# Patient Record
Sex: FEMALE | Race: WHITE | NOT HISPANIC OR LATINO
[De-identification: names, ages, dates, MRNs, and addresses within clinical notes are randomized per-mention and may not be internally consistent; named-entity substitution may affect disease eponyms.]

---

## 2020-07-08 PROBLEM — Z00.00 ENCOUNTER FOR PREVENTIVE HEALTH EXAMINATION: Status: ACTIVE | Noted: 2020-07-08

## 2020-08-12 ENCOUNTER — APPOINTMENT (OUTPATIENT)
Dept: DERMATOLOGY | Facility: CLINIC | Age: 40
End: 2020-08-12

## 2021-10-04 ENCOUNTER — APPOINTMENT (OUTPATIENT)
Dept: DERMATOLOGY | Facility: CLINIC | Age: 41
End: 2021-10-04

## 2022-01-19 ENCOUNTER — APPOINTMENT (OUTPATIENT)
Dept: DERMATOLOGY | Facility: CLINIC | Age: 42
End: 2022-01-19
Payer: COMMERCIAL

## 2022-01-19 ENCOUNTER — NON-APPOINTMENT (OUTPATIENT)
Age: 42
End: 2022-01-19

## 2022-01-19 DIAGNOSIS — L71.9 ROSACEA, UNSPECIFIED: ICD-10-CM

## 2022-01-19 PROCEDURE — 99203 OFFICE O/P NEW LOW 30 MIN: CPT

## 2022-01-19 RX ORDER — AZELAIC ACID 0.15 G/G
15 AEROSOL, FOAM TOPICAL
Qty: 1 | Refills: 2 | Status: ACTIVE | COMMUNITY
Start: 2022-01-19 | End: 1900-01-01

## 2022-01-19 NOTE — PHYSICAL EXAM
[FreeTextEntry3] : erythematous fine papules, confluent in the bilateral malar region.\par Mild erythema and scale, bilateral alar crease.

## 2022-01-19 NOTE — ASSESSMENT
[FreeTextEntry1] : Rosacea.  Some overlap with Seb derm.\par Discussed at length with patient.\par Finacea foam bid.\par Declined low dose doxy at this time, but will call if the Finacea foam alone does not control the condition.\par Oil free preps recommended.\par Discussed flare factors.\par f/u in 3 months.

## 2022-01-19 NOTE — HISTORY OF PRESENT ILLNESS
[FreeTextEntry1] : Facial rash. [de-identified] : Red spots and bumps.  Persistent for years, with periodic exacerbations.\par Tx in past elsewhere with various topicals (ineffective) and doxy 100mg bid (improved, but did not want to stay on antibiotic for long term).\par She denies EtOH, denies spicy food, drinks 1 cup of coffee per day.\par She works at home, wears make-up.  She is compliant with sunscreens.

## 2022-04-13 ENCOUNTER — APPOINTMENT (OUTPATIENT)
Dept: DERMATOLOGY | Facility: CLINIC | Age: 42
End: 2022-04-13